# Patient Record
Sex: FEMALE | Race: WHITE | Employment: FULL TIME | URBAN - METROPOLITAN AREA
[De-identification: names, ages, dates, MRNs, and addresses within clinical notes are randomized per-mention and may not be internally consistent; named-entity substitution may affect disease eponyms.]

---
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
## 2021-01-05 LAB
ALBUMIN SERPL-MCNC: 4.7 G/DL (ref 3.9–5)
ALBUMIN/GLOB SERPL: 1.9 {RATIO} (ref 1.2–2.2)
ALP SERPL-CCNC: 73 IU/L (ref 39–117)
ALT SERPL-CCNC: 26 IU/L (ref 0–32)
AST SERPL-CCNC: 20 IU/L (ref 0–40)
BILIRUB SERPL-MCNC: 0.5 MG/DL (ref 0–1.2)
BUN SERPL-MCNC: 7 MG/DL (ref 6–20)
BUN/CREAT SERPL: 10 (ref 9–23)
CALCIUM SERPL-MCNC: 9.5 MG/DL (ref 8.7–10.2)
CHLORIDE SERPL-SCNC: 101 MMOL/L (ref 96–106)
CHOLEST SERPL-MCNC: 175 MG/DL (ref 100–169)
CO2 SERPL-SCNC: 22 MMOL/L (ref 20–29)
CREAT SERPL-MCNC: 0.7 MG/DL (ref 0.57–1)
EST. AVERAGE GLUCOSE BLD GHB EST-MCNC: 88 MG/DL
GLOBULIN SER CALC-MCNC: 2.5 G/DL (ref 1.5–4.5)
GLUCOSE SERPL-MCNC: 86 MG/DL (ref 65–99)
HBA1C MFR BLD: 4.7 % (ref 4.8–5.6)
HDLC SERPL-MCNC: 57 MG/DL
LDLC SERPL CALC-MCNC: 103 MG/DL (ref 0–109)
POTASSIUM SERPL-SCNC: 4.3 MMOL/L (ref 3.5–5.2)
PROT SERPL-MCNC: 7.2 G/DL (ref 6–8.5)
SODIUM SERPL-SCNC: 137 MMOL/L (ref 134–144)
TRIGL SERPL-MCNC: 82 MG/DL (ref 0–89)
TSH SERPL DL<=0.005 MIU/L-ACNC: 1.79 UIU/ML (ref 0.45–4.5)
VLDLC SERPL CALC-MCNC: 15 MG/DL (ref 5–40)

## 2021-06-21 ENCOUNTER — HOSPITAL ENCOUNTER (EMERGENCY)
Age: 21
Discharge: HOME | End: 2021-06-22
Attending: EMERGENCY MEDICINE
Payer: OTHER MISCELLANEOUS

## 2021-06-21 ENCOUNTER — APPOINTMENT (OUTPATIENT)
Dept: GENERAL RADIOLOGY | Age: 21
End: 2021-06-21
Attending: EMERGENCY MEDICINE
Payer: OTHER MISCELLANEOUS

## 2021-06-21 DIAGNOSIS — S82.401A TIBIA/FIBULA FRACTURE, RIGHT, CLOSED, INITIAL ENCOUNTER: Primary | ICD-10-CM

## 2021-06-21 DIAGNOSIS — S82.201A TIBIA/FIBULA FRACTURE, RIGHT, CLOSED, INITIAL ENCOUNTER: Primary | ICD-10-CM

## 2021-06-21 LAB
ALBUMIN SERPL-MCNC: 4.2 G/DL (ref 3.5–5)
ALBUMIN/GLOB SERPL: 1.1 {RATIO} (ref 1.1–2.2)
ALP SERPL-CCNC: 74 U/L (ref 45–117)
ALT SERPL-CCNC: 29 U/L (ref 12–78)
ANION GAP SERPL CALC-SCNC: 5 MMOL/L (ref 5–15)
AST SERPL-CCNC: 21 U/L (ref 15–37)
BASOPHILS # BLD: 0 K/UL (ref 0–0.1)
BASOPHILS NFR BLD: 0 % (ref 0–1)
BILIRUB SERPL-MCNC: 0.4 MG/DL (ref 0.2–1)
BUN SERPL-MCNC: 11 MG/DL (ref 6–20)
BUN/CREAT SERPL: 13 (ref 12–20)
CALCIUM SERPL-MCNC: 9.1 MG/DL (ref 8.5–10.1)
CHLORIDE SERPL-SCNC: 108 MMOL/L (ref 97–108)
CO2 SERPL-SCNC: 26 MMOL/L (ref 21–32)
COMMENT, HOLDF: NORMAL
CREAT SERPL-MCNC: 0.87 MG/DL (ref 0.55–1.02)
DIFFERENTIAL METHOD BLD: NORMAL
EOSINOPHIL # BLD: 0.1 K/UL (ref 0–0.4)
EOSINOPHIL NFR BLD: 1 % (ref 0–7)
ERYTHROCYTE [DISTWIDTH] IN BLOOD BY AUTOMATED COUNT: 12 % (ref 11.5–14.5)
GLOBULIN SER CALC-MCNC: 3.7 G/DL (ref 2–4)
GLUCOSE SERPL-MCNC: 111 MG/DL (ref 65–100)
HCT VFR BLD AUTO: 43.7 % (ref 35–47)
HGB BLD-MCNC: 15.6 G/DL (ref 11.5–16)
IMM GRANULOCYTES # BLD AUTO: 0 K/UL (ref 0–0.04)
IMM GRANULOCYTES NFR BLD AUTO: 0 % (ref 0–0.5)
LYMPHOCYTES # BLD: 3 K/UL (ref 0.8–3.5)
LYMPHOCYTES NFR BLD: 28 % (ref 12–49)
MCH RBC QN AUTO: 31.5 PG (ref 26–34)
MCHC RBC AUTO-ENTMCNC: 35.7 G/DL (ref 30–36.5)
MCV RBC AUTO: 88.3 FL (ref 80–99)
MONOCYTES # BLD: 0.5 K/UL (ref 0–1)
MONOCYTES NFR BLD: 5 % (ref 5–13)
NEUTS SEG # BLD: 6.9 K/UL (ref 1.8–8)
NEUTS SEG NFR BLD: 66 % (ref 32–75)
NRBC # BLD: 0 K/UL (ref 0–0.01)
NRBC BLD-RTO: 0 PER 100 WBC
PLATELET # BLD AUTO: 335 K/UL (ref 150–400)
PMV BLD AUTO: 10.2 FL (ref 8.9–12.9)
POTASSIUM SERPL-SCNC: 3.6 MMOL/L (ref 3.5–5.1)
PROT SERPL-MCNC: 7.9 G/DL (ref 6.4–8.2)
RBC # BLD AUTO: 4.95 M/UL (ref 3.8–5.2)
SAMPLES BEING HELD,HOLD: NORMAL
SODIUM SERPL-SCNC: 139 MMOL/L (ref 136–145)
WBC # BLD AUTO: 10.6 K/UL (ref 3.6–11)

## 2021-06-21 PROCEDURE — 36415 COLL VENOUS BLD VENIPUNCTURE: CPT

## 2021-06-21 PROCEDURE — 74011250636 HC RX REV CODE- 250/636: Performed by: EMERGENCY MEDICINE

## 2021-06-21 PROCEDURE — 99285 EMERGENCY DEPT VISIT HI MDM: CPT

## 2021-06-21 PROCEDURE — 86901 BLOOD TYPING SEROLOGIC RH(D): CPT

## 2021-06-21 PROCEDURE — 96374 THER/PROPH/DIAG INJ IV PUSH: CPT

## 2021-06-21 PROCEDURE — 85610 PROTHROMBIN TIME: CPT

## 2021-06-21 PROCEDURE — 96376 TX/PRO/DX INJ SAME DRUG ADON: CPT

## 2021-06-21 PROCEDURE — 99151 MOD SED SAME PHYS/QHP <5 YRS: CPT

## 2021-06-21 PROCEDURE — 85025 COMPLETE CBC W/AUTO DIFF WBC: CPT

## 2021-06-21 PROCEDURE — 75810000303 HC CLSD TRMT  FRACTURE/DISLOCATION W/  ANES

## 2021-06-21 PROCEDURE — 99284 EMERGENCY DEPT VISIT MOD MDM: CPT

## 2021-06-21 PROCEDURE — 80053 COMPREHEN METABOLIC PANEL: CPT

## 2021-06-21 PROCEDURE — 84703 CHORIONIC GONADOTROPIN ASSAY: CPT

## 2021-06-21 PROCEDURE — 73590 X-RAY EXAM OF LOWER LEG: CPT

## 2021-06-21 PROCEDURE — 73610 X-RAY EXAM OF ANKLE: CPT

## 2021-06-21 RX ORDER — FENTANYL CITRATE 50 UG/ML
100 INJECTION, SOLUTION INTRAMUSCULAR; INTRAVENOUS
Status: COMPLETED | OUTPATIENT
Start: 2021-06-21 | End: 2021-06-22

## 2021-06-21 RX ORDER — LORAZEPAM 2 MG/ML
1 INJECTION INTRAMUSCULAR AS NEEDED
Status: DISCONTINUED | OUTPATIENT
Start: 2021-06-21 | End: 2021-06-22 | Stop reason: HOSPADM

## 2021-06-21 RX ORDER — KETAMINE HYDROCHLORIDE 50 MG/ML
2 INJECTION, SOLUTION INTRAMUSCULAR; INTRAVENOUS
Status: DISCONTINUED | OUTPATIENT
Start: 2021-06-21 | End: 2021-06-22

## 2021-06-21 RX ORDER — FENTANYL CITRATE 50 UG/ML
100 INJECTION, SOLUTION INTRAMUSCULAR; INTRAVENOUS
Status: COMPLETED | OUTPATIENT
Start: 2021-06-21 | End: 2021-06-21

## 2021-06-21 RX ADMIN — FENTANYL CITRATE 100 MCG: 50 INJECTION INTRAMUSCULAR; INTRAVENOUS at 22:03

## 2021-06-21 RX ADMIN — SODIUM CHLORIDE 1000 ML: 900 INJECTION, SOLUTION INTRAVENOUS at 23:06

## 2021-06-21 RX ADMIN — FENTANYL CITRATE 100 MCG: 50 INJECTION INTRAMUSCULAR; INTRAVENOUS at 23:26

## 2021-06-21 NOTE — Clinical Note
[address]  [facility] EMERGENCY DEPT  [address] [facility] [facility]  [place] [address]  [phone]    Work/School Note    Date: 6/21/2021    To Whom It May concern:    Kevin Salgado was seen and treated today in the emergency room by the following provider(s):  Attending Provider: Liseth Burton MD.      Kevin Salgado is excused from work/school on 06/22/21 and 06/23/21. She is medically clear to return to work/school on 6/24/2021.        Sincerely,          Carmela Roth MD

## 2021-06-22 ENCOUNTER — APPOINTMENT (OUTPATIENT)
Dept: GENERAL RADIOLOGY | Age: 21
End: 2021-06-22
Attending: EMERGENCY MEDICINE
Payer: OTHER MISCELLANEOUS

## 2021-06-22 VITALS
SYSTOLIC BLOOD PRESSURE: 135 MMHG | DIASTOLIC BLOOD PRESSURE: 84 MMHG | WEIGHT: 230 LBS | BODY MASS INDEX: 39.27 KG/M2 | HEART RATE: 103 BPM | TEMPERATURE: 98.3 F | HEIGHT: 64 IN | RESPIRATION RATE: 16 BRPM | OXYGEN SATURATION: 99 %

## 2021-06-22 LAB
ABO + RH BLD: NORMAL
BLOOD GROUP ANTIBODIES SERPL: NORMAL
HCG SERPL QL: NEGATIVE
INR PPP: 1.1 (ref 0.9–1.1)
PROTHROMBIN TIME: 11.3 SEC (ref 9–11.1)
SPECIMEN EXP DATE BLD: NORMAL

## 2021-06-22 PROCEDURE — 73600 X-RAY EXAM OF ANKLE: CPT

## 2021-06-22 PROCEDURE — 96376 TX/PRO/DX INJ SAME DRUG ADON: CPT

## 2021-06-22 PROCEDURE — 74011000250 HC RX REV CODE- 250: Performed by: PHYSICIAN ASSISTANT

## 2021-06-22 PROCEDURE — 74011250636 HC RX REV CODE- 250/636: Performed by: EMERGENCY MEDICINE

## 2021-06-22 PROCEDURE — 96375 TX/PRO/DX INJ NEW DRUG ADDON: CPT

## 2021-06-22 RX ORDER — OXYCODONE AND ACETAMINOPHEN 5; 325 MG/1; MG/1
1 TABLET ORAL
Qty: 30 TABLET | Refills: 0 | Status: SHIPPED | OUTPATIENT
Start: 2021-06-22 | End: 2021-06-25

## 2021-06-22 RX ORDER — OXYCODONE AND ACETAMINOPHEN 5; 325 MG/1; MG/1
1 TABLET ORAL
Qty: 10 TABLET | Refills: 0 | Status: SHIPPED | OUTPATIENT
Start: 2021-06-22 | End: 2021-06-22 | Stop reason: SDUPTHER

## 2021-06-22 RX ORDER — OXYCODONE AND ACETAMINOPHEN 5; 325 MG/1; MG/1
1 TABLET ORAL
Qty: 10 TABLET | Refills: 0 | Status: SHIPPED | OUTPATIENT
Start: 2021-06-22 | End: 2021-06-25

## 2021-06-22 RX ORDER — KETAMINE HYDROCHLORIDE 10 MG/ML
0.35 INJECTION, SOLUTION INTRAMUSCULAR; INTRAVENOUS ONCE
Status: COMPLETED | OUTPATIENT
Start: 2021-06-22 | End: 2021-06-22

## 2021-06-22 RX ORDER — MORPHINE SULFATE 2 MG/ML
4 INJECTION, SOLUTION INTRAMUSCULAR; INTRAVENOUS
Status: COMPLETED | OUTPATIENT
Start: 2021-06-22 | End: 2021-06-22

## 2021-06-22 RX ADMIN — FENTANYL CITRATE 100 MCG: 50 INJECTION INTRAMUSCULAR; INTRAVENOUS at 00:47

## 2021-06-22 RX ADMIN — MORPHINE SULFATE 4 MG: 2 INJECTION, SOLUTION INTRAMUSCULAR; INTRAVENOUS at 02:23

## 2021-06-22 RX ADMIN — KETAMINE HYDROCHLORIDE 36.5 MG: 10 INJECTION, SOLUTION INTRAMUSCULAR; INTRAVENOUS at 00:35

## 2021-06-22 NOTE — ED PROVIDER NOTES
EMERGENCY DEPARTMENT HISTORY AND PHYSICAL EXAM     ------------------------------------------------------------------------------------------------------  Please note that this dictation was completed with [employer], the [employer] voice recognition software. Quite often unanticipated grammatical, syntax, homophones, and other interpretive errors are inadvertently transcribed by the computer software. Please disregard these errors. Please excuse any errors that have escaped final proofreading.  -----------------------------------------------------------------------------------------------------------------    Date: 6/21/2021  Patient Name: [facility]    History of Presenting Illness     Chief Complaint   Patient presents with    Leg Pain     pt fell off stool at work, hurt R lower leg, pt not able to ambulate or lower leg, deformity to lower part of leg with bruising       History Provided By: Patient, family    HPI: [facility] is a 24 y.o. female, without significant pmhx, who presents via private vehicle to the ED with c/o right ankle pain and swelling. Patient reports she works at [employer] and was standing on a stool when she subsequently fell onto her right leg. Patient with immediate pain, bruising and swelling and deformity of her ankle. Denies having hit her head or loss of consciousness. Denies having previous surgery or anesthesia. No daily medications. Pt also specifically denies any recent fevers, chills, CP, SOB, nausea, vomiting, diarrhea, abd pain, changes in BM, urinary sxs, or headache. PCP: Jayne Vale MD    Social Hx: denies tobacco, denies EtOH, denies Illicit Drugs     There are no other complaints, changes, or physical findings at this time.      No Known Allergies      Current Facility-Administered Medications   Medication Dose Route Frequency Provider Last Rate Last Admin    LORazepam (ATIVAN) injection 1 mg  1 mg IntraVENous PRN Malina Guerin MD  sodium chloride 0.9 % bolus infusion 1,000 mL  1,000 mL IntraVENous NOW Nathan Buckner MD         Current Outpatient Medications   Medication Sig Dispense Refill    dicyclomine (BENTYL) 10 mg capsule       norethindrone-ethinyl estradiol (Microgestin FE 1/20, 28,) 1 mg-20 mcg (21)/75 mg (7) tab Microgestin FE 1/20 (28) 1 mg-20 mcg (21)/75 mg (7) tablet   Take 1 tablet(s) every day by oral route.  omeprazole (PRILOSEC) 20 mg capsule       fexofenadine HCl (ALLEGRA PO) Take  by mouth daily. Past History     Past Medical History:  No past medical history on file. Past Surgical History:  Past Surgical History:   Procedure Laterality Date    HX OTHER SURGICAL  4/2/2013    dental surgery(mouth)       Family History:  Family History   Problem Relation Age of Onset    Hypertension Mother     Thyroid Disease Mother     Diabetes Father     Psychiatric Disorder Father        Social History:  Social History     Tobacco Use    Smoking status: Never Smoker    Smokeless tobacco: Never Used   Substance Use Topics    Alcohol use: No    Drug use: Not Currently       Allergies:  No Known Allergies      Review of Systems   Review of Systems   Constitutional: Negative. Negative for fever. Eyes: Negative. Respiratory: Negative. Negative for shortness of breath. Cardiovascular: Negative for chest pain. Gastrointestinal: Negative for abdominal pain, nausea and vomiting. Endocrine: Negative. Genitourinary: Negative. Negative for difficulty urinating, dysuria and hematuria. Musculoskeletal: Positive for arthralgias and myalgias. Skin: Negative. Neurological: Negative. Psychiatric/Behavioral: Negative for suicidal ideas. All other systems reviewed and are negative. Physical Exam   Physical Exam  Vitals and nursing note reviewed. Constitutional:       General: She is not in acute distress. Appearance: She is well-developed. She is not diaphoretic.    HENT:      Head: Normocephalic and atraumatic. Nose: Nose normal.   Eyes:      General: No scleral icterus. Conjunctiva/sclera: Conjunctivae normal.   Neck:      Trachea: No tracheal deviation. Cardiovascular:      Rate and Rhythm: Normal rate and regular rhythm. Heart sounds: Normal heart sounds. No murmur heard. No friction rub. Pulmonary:      Effort: Pulmonary effort is normal. No respiratory distress. Breath sounds: Normal breath sounds. No stridor. No wheezing or rales. Abdominal:      General: Bowel sounds are normal. There is no distension. Palpations: Abdomen is soft. Tenderness: There is no abdominal tenderness. There is no rebound. Musculoskeletal:         General: Normal range of motion. Cervical back: Normal range of motion. Right lower leg: Swelling, deformity, tenderness and bony tenderness present. Legs:       Comments: Compartments are soft   Skin:     General: Skin is warm and dry. Findings: No rash. Neurological:      Mental Status: She is alert and oriented to person, place, and time. Cranial Nerves: No cranial nerve deficit. Psychiatric:         Speech: Speech normal.         Behavior: Behavior normal.         Thought Content: Thought content normal.         Judgment: Judgment normal.           Diagnostic Study Results     Labs -     Recent Results (from the past 12 hour(s))   SAMPLES BEING HELD    Collection Time: 06/21/21  9:59 PM   Result Value Ref Range    SAMPLES BEING HELD LV PST     COMMENT        Add-on orders for these samples will be processed based on acceptable specimen integrity and analyte stability, which may vary by analyte.    CBC WITH AUTOMATED DIFF    Collection Time: 06/21/21  9:59 PM   Result Value Ref Range    WBC 10.6 3.6 - 11.0 K/uL    RBC 4.95 3.80 - 5.20 M/uL    HGB 15.6 11.5 - 16.0 g/dL    HCT 43.7 35.0 - 47.0 %    MCV 88.3 80.0 - 99.0 FL    MCH 31.5 26.0 - 34.0 PG    MCHC 35.7 30.0 - 36.5 g/dL    RDW 12.0 11.5 - 14.5 %    PLATELET 524 663 - 621 K/uL    MPV 10.2 8.9 - 12.9 FL    NRBC 0.0 0  WBC    ABSOLUTE NRBC 0.00 0.00 - 0.01 K/uL    NEUTROPHILS 66 32 - 75 %    LYMPHOCYTES 28 12 - 49 %    MONOCYTES 5 5 - 13 %    EOSINOPHILS 1 0 - 7 %    BASOPHILS 0 0 - 1 %    IMMATURE GRANULOCYTES 0 0.0 - 0.5 %    ABS. NEUTROPHILS 6.9 1.8 - 8.0 K/UL    ABS. LYMPHOCYTES 3.0 0.8 - 3.5 K/UL    ABS. MONOCYTES 0.5 0.0 - 1.0 K/UL    ABS. EOSINOPHILS 0.1 0.0 - 0.4 K/UL    ABS. BASOPHILS 0.0 0.0 - 0.1 K/UL    ABS. IMM. GRANS. 0.0 0.00 - 0.04 K/UL    DF AUTOMATED     METABOLIC PANEL, COMPREHENSIVE    Collection Time: 06/21/21  9:59 PM   Result Value Ref Range    Sodium 139 136 - 145 mmol/L    Potassium 3.6 3.5 - 5.1 mmol/L    Chloride 108 97 - 108 mmol/L    CO2 26 21 - 32 mmol/L    Anion gap 5 5 - 15 mmol/L    Glucose 111 (H) 65 - 100 mg/dL    BUN 11 6 - 20 MG/DL    Creatinine 0.87 0.55 - 1.02 MG/DL    BUN/Creatinine ratio 13 12 - 20      GFR est AA >60 >60 ml/min/1.73m2    GFR est non-AA >60 >60 ml/min/1.73m2    Calcium 9.1 8.5 - 10.1 MG/DL    Bilirubin, total 0.4 0.2 - 1.0 MG/DL    ALT (SGPT) 29 12 - 78 U/L    AST (SGOT) 21 15 - 37 U/L    Alk. phosphatase 74 45 - 117 U/L    Protein, total 7.9 6.4 - 8.2 g/dL    Albumin 4.2 3.5 - 5.0 g/dL    Globulin 3.7 2.0 - 4.0 g/dL    A-G Ratio 1.1 1.1 - 2.2     PROTHROMBIN TIME + INR    Collection Time: 06/21/21 11:46 PM   Result Value Ref Range    INR 1.1 0.9 - 1.1      Prothrombin time 11.3 (H) 9.0 - 11.1 sec   TYPE & SCREEN    Collection Time: 06/21/21 11:46 PM   Result Value Ref Range    Crossmatch Expiration 06/24/2021,2359     ABO/Rh(D) Ayde Winters NEGATIVE     Antibody screen NEG    HCG QL SERUM    Collection Time: 06/21/21 11:46 PM   Result Value Ref Range    HCG, Ql. Negative NEG         Radiologic Studies -   XR ANKLE RT AP/LAT   Final Result   Slightly improved alignment of displaced distal tibia and fibular   fractures following closed reduction.       XR ANKLE RT MIN 3 V   Final Result   Displaced oblique fractures of the distal tibia and fibula. XR TIB/FIB RT   Final Result   Displaced oblique fractures of the distal tibia and fibula. CT Results  (Last 48 hours)    None        CXR Results  (Last 48 hours)    None            Medical Decision Making   I am the first provider for this patient. I reviewed the vital signs, available nursing notes, past medical history, past surgical history, family history and social history. Vital Signs-Reviewed the patient's vital signs. Patient Vitals for the past 12 hrs:   Temp Pulse Resp BP SpO2   06/22/21 0049  (!) 111 22 (!) 168/97 97 %   06/22/21 0042  (!) 119 22 (!) 170/110 98 %   06/21/21 2300 98.5 °F (36.9 °C) (!) 105 18 (!) 147/89 98 %   06/21/21 2245    (!) 143/86 98 %   06/21/21 2230    (!) 155/93 99 %   06/21/21 2138 98.5 °F (36.9 °C)  18 (!) 156/104 99 %       Pulse Oximetry Analysis - 99% on RA Normal    Records Reviewed/Interpretted: Nursing Notes from triage and Old Medical Records, previous emergency department visits for acute chest pain and sensation of swelling in throat    Provider Notes (Medical Decision Making):     DDX:  Tib-fib fracture, ligamentous injury    Plan:  X-ray, analgesic, Ortho consult, splinting    Impression:  Distal tib-fib fracture    ED Course:   Initial assessment performed. The patients presenting problems have been discussed, and they are in agreement with the care plan formulated and outlined with them. I have encouraged them to ask questions as they arise throughout their visit. I reviewed our electronic medical record system for any past medical records that were available that may contribute to the patients current condition, the nursing notes and and vital signs from today's visit  Nursing notes will be reviewed as they become available in realtime while the pt has been in the ED. Henrine Krabbe, MD      I personally reviewed/interpreted pt's imaging.   Agree with official read by radiology as noted above. Jona Piper MD    CONSULT NOTE:   11:08 PM  Jona Piper MD spoke with  SCOTT Gurrola,   Specialty: Orthopedics  Consulted Orthopedic team by Lorena Jennings due to distal tib fib fracture. Discussed pt's HPI and available diagnostic results thus far. Consultant will come evaluate patient and assist with splinting. Jona Piper MD    Procedure Note - Reduction:    11:10 PM  Performed by Frank CHAVEZ. Procedure start time: 2336  Procedure end time: 0045  Procedure was performed with sedation. Medications provided as below:  Medications   LORazepam (ATIVAN) injection 1 mg (has no administration in time range)   sodium chloride 0.9 % bolus infusion 1,000 mL (has no administration in time range)   fentaNYL citrate (PF) injection 100 mcg (100 mcg IntraVENous Given 6/21/21 2203)   fentaNYL citrate (PF) injection 100 mcg (100 mcg IntraVENous Given 6/22/21 0047)   ketamine (KETALAR) 10 mg/mL injection 36.5 mg (36.5 mg IntraVENous Given 6/22/21 0035)       Immediately prior to the procedure, the patient was reevaluated and found suitable for the planned procedure and any planned medications. Immediately prior to the procedure a time out was called to verify the correct patient, procedure, equipment, staff, and marking as appropriate. Prior to the procedure, neurovascular exam was intact. Analgesia was obtained with procedural sedation - see record. To achieve reduction of the patient's right ankle joint, logitudinal traction manipulation was utilized. The joint was successfully reduced. Neurovascular exam was intact following the procedure. Post reduction x-ray ordered. The procedure took 1-15 minutes, and pt tolerated well.       1:12 AM  Progress note:  Pt noted to be feeling better, ready for discharge. Discussed lab and imaging findings with pt, specifically noting tib/fib fx. Pt will follow up with ortho as instructed.  All questions have been answered, pt voiced understanding and agreement with plan. Specific return precautions provided in addition to instructions for pt to return to the ED immediately should sx worsen at any time. Heather Holloway MD             Critical Care Time:     none      Diagnosis     Clinical Impression:   1. Tibia/fibula fracture, right, closed, initial encounter        PLAN:  1. Current Discharge Medication List        2. Follow-up Information     Follow up With Specialties Details Why Contact Info    Kamille Cagle MD Orthopedic Surgery Call today  [address]  [address]  [address] [address]  [phone]      Aiden JonesrDO Orthopedic Surgery Call today  [address] [facility]  [address] Refugio Bond MD Internal Medicine Schedule an appointment as soon as possible for a visit in 2 days  [address] [place] [address]  [phone]          Return to ED if worse     Disposition:    1:12 AM   The patient's results have been reviewed with family and/or caregiver. They verbally convey their understanding and agreement of the patient's signs, symptoms, diagnosis, treatment and prognosis and additionally agree to follow up as recommended in the discharge instructions or to return to the Emergency Room should the patient's condition change prior to their follow-up appointment. The family and/or caregiver verbally agrees with the care-plan and all of their questions have been answered. The discharge instructions have also been provided to the them with educational information regarding the patient's diagnosis as well a list of reasons why the patient would want to return to the ER prior to their follow-up appointment should their condition change.   Heather Holloway MD

## 2021-06-22 NOTE — PROGRESS NOTES
ORTHOPAEDIC CONSULT NOTE    Subjective:     Date of Consultation:  June 22, 2021      Ryan Wnag is a 24 y.o. female who is being seen for right distal tib/fib fracture. Pt reports falling from a stool at work([employer]). Ambulates at baseline unassisted. 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 ?  Parents at bedside    Patient Active Problem List    Diagnosis Date Noted    Migraine without aura and without status migrainosus, not intractable 12/11/2020    Gastroesophageal reflux disease without esophagitis 12/11/2020    Anxiety and depression 12/11/2020    Other irritable bowel syndrome 12/11/2020     Family History   Problem Relation Age of Onset    Hypertension Mother     Thyroid Disease Mother     Diabetes Father     Psychiatric Disorder Father       Social History     Tobacco Use    Smoking status: Never Smoker    Smokeless tobacco: Never Used   Substance Use Topics    Alcohol use: No     No past medical history on file. Past Surgical History:   Procedure Laterality Date    HX OTHER SURGICAL  4/2/2013    dental surgery(mouth)      Prior to Admission medications    Medication Sig Start Date End Date Taking? Authorizing Provider   dicyclomine (BENTYL) 10 mg capsule  12/8/20   Provider, Historical   norethindrone-ethinyl estradiol (Microgestin FE 1/20, 28,) 1 mg-20 mcg (21)/75 mg (7) tab Microgestin FE 1/20 (28) 1 mg-20 mcg (21)/75 mg (7) tablet   Take 1 tablet(s) every day by oral route. Provider, Historical   omeprazole (PRILOSEC) 20 mg capsule  10/13/20   Provider, Historical   fexofenadine HCl (ALLEGRA PO) Take  by mouth daily.     Other, MD Kristian     Current Facility-Administered Medications   Medication Dose Route Frequency    LORazepam (ATIVAN) injection 1 mg  1 mg IntraVENous PRN    sodium chloride 0.9 % bolus infusion 1,000 mL  1,000 mL IntraVENous NOW     Current Outpatient Medications   Medication Sig    dicyclomine (BENTYL) 10 mg capsule     norethindrone-ethinyl estradiol (Microgestin FE /, 28,) 1 mg-20 mcg (21)/75 mg (7) tab Microgestin FE / (28) 1 mg-20 mcg (21)/75 mg (7) tablet   Take 1 tablet(s) every day by oral route.  omeprazole (PRILOSEC) 20 mg capsule     fexofenadine HCl (ALLEGRA PO) Take  by mouth daily. No Known Allergies     Review of Systems:  A comprehensive review of systems was negative except for that written in the HPI. Mental Status: no dementia    Objective:     Patient Vitals for the past 8 hrs:   BP Temp Pulse Resp SpO2 Height Weight   21 0049 (!) 168/97  (!) 111 22 97 %     21 0042 (!) 170/110  (!) 119 22 98 %     21 2300 (!) 147/89 98.5 °F (36.9 °C) (!) 105 18 98 %     21 2245 (!) 143/86    98 %     21 2230 (!) 155/93    99 %     21 2138 (!) 156/104 98.5 °F (36.9 °C)  18 99 % 5' 4\" (1.626 m) 104.3 kg (230 lb)     Temp (24hrs), Av.5 °F (36.9 °C), Min:98.5 °F (36.9 °C), Max:98.5 °F (36.9 °C)      Gen: Well-developed,  in no acute distress, obese  HEENT: Pink conjunctivae, hearing intact to voice, moist mucous membranes   Neck: Supple  Resp: No respiratory distress   Card: RRR, palpable distal pulse-equal bilaterally, birsk cap refill all distal digits   Abd:  non-distended  Musc: right distal leg with subtle deformity. Moderate swelling and early ecchymosis about the medal aspect(see pic). compartments are soft, intact EHL/FHL, intact achilles mech. Skin: intact, No skin breakdown noted. Skin warm, pink, dry  Neuro: Cranial nerves are grossly intact, no focal motor weakness, follows commands appropriately   Psych: Good insight, oriented to person, place and time, alert            Body mass index is 39.48 kg/m². Imaging Review: INDICATION: Right ankle pain and deformity   FINDINGS: 3 views of the right ankle and AP and lateral views of the right tibia  and fibula demonstrate displaced, oblique fractures of the distal tibial and  fibular shafts.  There is apex anterior angulation of the fracture fragments. No  associated dislocation is evident. Mild soft tissue swelling is seen over the fracture site.   IMPRESSION--Displaced oblique fractures of the distal tibia and fibula. Labs:   Recent Results (from the past 24 hour(s))   SAMPLES BEING HELD    Collection Time: 06/21/21  9:59 PM   Result Value Ref Range    SAMPLES BEING HELD LV PST     COMMENT        Add-on orders for these samples will be processed based on acceptable specimen integrity and analyte stability, which may vary by analyte. CBC WITH AUTOMATED DIFF    Collection Time: 06/21/21  9:59 PM   Result Value Ref Range    WBC 10.6 3.6 - 11.0 K/uL    RBC 4.95 3.80 - 5.20 M/uL    HGB 15.6 11.5 - 16.0 g/dL    HCT 43.7 35.0 - 47.0 %    MCV 88.3 80.0 - 99.0 FL    MCH 31.5 26.0 - 34.0 PG    MCHC 35.7 30.0 - 36.5 g/dL    RDW 12.0 11.5 - 14.5 %    PLATELET 523 595 - 057 K/uL    MPV 10.2 8.9 - 12.9 FL    NRBC 0.0 0  WBC    ABSOLUTE NRBC 0.00 0.00 - 0.01 K/uL    NEUTROPHILS 66 32 - 75 %    LYMPHOCYTES 28 12 - 49 %    MONOCYTES 5 5 - 13 %    EOSINOPHILS 1 0 - 7 %    BASOPHILS 0 0 - 1 %    IMMATURE GRANULOCYTES 0 0.0 - 0.5 %    ABS. NEUTROPHILS 6.9 1.8 - 8.0 K/UL    ABS. LYMPHOCYTES 3.0 0.8 - 3.5 K/UL    ABS. MONOCYTES 0.5 0.0 - 1.0 K/UL    ABS. EOSINOPHILS 0.1 0.0 - 0.4 K/UL    ABS. BASOPHILS 0.0 0.0 - 0.1 K/UL    ABS. IMM.  GRANS. 0.0 0.00 - 0.04 K/UL    DF AUTOMATED     METABOLIC PANEL, COMPREHENSIVE    Collection Time: 06/21/21  9:59 PM   Result Value Ref Range    Sodium 139 136 - 145 mmol/L    Potassium 3.6 3.5 - 5.1 mmol/L    Chloride 108 97 - 108 mmol/L    CO2 26 21 - 32 mmol/L    Anion gap 5 5 - 15 mmol/L    Glucose 111 (H) 65 - 100 mg/dL    BUN 11 6 - 20 MG/DL    Creatinine 0.87 0.55 - 1.02 MG/DL    BUN/Creatinine ratio 13 12 - 20      GFR est AA >60 >60 ml/min/1.73m2    GFR est non-AA >60 >60 ml/min/1.73m2    Calcium 9.1 8.5 - 10.1 MG/DL    Bilirubin, total 0.4 0.2 - 1.0 MG/DL    ALT (SGPT) 29 12 - 78 U/L    AST (SGOT) 21 15 - 37 U/L    Alk. phosphatase 74 45 - 117 U/L    Protein, total 7.9 6.4 - 8.2 g/dL    Albumin 4.2 3.5 - 5.0 g/dL    Globulin 3.7 2.0 - 4.0 g/dL    A-G Ratio 1.1 1.1 - 2.2     PROTHROMBIN TIME + INR    Collection Time: 06/21/21 11:46 PM   Result Value Ref Range    INR 1.1 0.9 - 1.1      Prothrombin time 11.3 (H) 9.0 - 11.1 sec   TYPE & SCREEN    Collection Time: 06/21/21 11:46 PM   Result Value Ref Range    Crossmatch Expiration 06/24/2021,2350     ABO/Rh(D) Sonia Trejo NEGATIVE     Antibody screen NEG    HCG QL SERUM    Collection Time: 06/21/21 11:46 PM   Result Value Ref Range    HCG, Ql. Negative NEG           Impression:     Patient Active Problem List    Diagnosis Date Noted    Migraine without aura and without status migrainosus, not intractable 12/11/2020    Gastroesophageal reflux disease without esophagitis 12/11/2020    Anxiety and depression 12/11/2020    Other irritable bowel syndrome 12/11/2020     Active Problems:    * No active hospital problems. *    Right distal tib/fib fracture  Plan:       Procedure:   DIscussed nature of condition and treatment options with patient and parents  Consent signed for closed reduction  Right leg/distal tib/fib fracture: pain mgt dose ketamine  Proceeded with reduction using gentle manual manipulation. I fitted a well padded, well molded posterior and stirrup splint for immobilization. Patient tolerated the procedure well. Neurovascular exam intact post procedure, Splint precautions reviewed. Post procedure films confirm improved alignment     Aggressive elevation and ice  Keep splint clean and dry  Crutches for safe mobility  NWB on the RLE  Percocet for pain mgt  Call for appt w/ Dr Nolan/Wesly Contreras aware and agrees with plan as above.         Isael Segura PA-C  [employer]

## 2021-06-22 NOTE — ED NOTES
Closed reduction of right ankle performed. Pt tolerated procedure well    I have reviewed discharge instructions with the patient/mother. The patient/mother verbalized understanding.

## 2021-06-22 NOTE — DISCHARGE INSTRUCTIONS
It was a pleasure taking care of you in our Emergency Department today. We know that when you come to [facility] you are entrusting us with your health, comfort, and safety. Our physicians and nurses honor that trust, and truly appreciate the opportunity to care for you and your loved ones. We also value your feedback. If you receive a survey about your Emergency Department experience today, please fill it out. We care about our patients' feedback, and we listen to what you have to say. Thank you!       Dr. Mile Salvador MD.

## 2021-06-23 ENCOUNTER — TELEPHONE (OUTPATIENT)
Dept: ORTHOPEDIC SURGERY | Age: 21
End: 2021-06-23

## 2021-07-03 ENCOUNTER — APPOINTMENT (OUTPATIENT)
Dept: GENERAL RADIOLOGY | Age: 21
End: 2021-07-03
Attending: EMERGENCY MEDICINE
Payer: OTHER MISCELLANEOUS

## 2021-07-03 ENCOUNTER — HOSPITAL ENCOUNTER (EMERGENCY)
Age: 21
Discharge: HOME | End: 2021-07-04
Attending: STUDENT IN AN ORGANIZED HEALTH CARE EDUCATION/TRAINING PROGRAM
Payer: OTHER MISCELLANEOUS

## 2021-07-03 VITALS
DIASTOLIC BLOOD PRESSURE: 74 MMHG | HEART RATE: 97 BPM | RESPIRATION RATE: 18 BRPM | SYSTOLIC BLOOD PRESSURE: 123 MMHG | OXYGEN SATURATION: 99 % | TEMPERATURE: 98.3 F | WEIGHT: 230 LBS | HEIGHT: 64 IN | BODY MASS INDEX: 39.27 KG/M2

## 2021-07-03 DIAGNOSIS — S82.401S TIBIA/FIBULA FRACTURE, RIGHT, SEQUELA: ICD-10-CM

## 2021-07-03 DIAGNOSIS — S82.201S TIBIA/FIBULA FRACTURE, RIGHT, SEQUELA: ICD-10-CM

## 2021-07-03 DIAGNOSIS — M79.604 RIGHT LEG PAIN: Primary | ICD-10-CM

## 2021-07-03 PROCEDURE — 73590 X-RAY EXAM OF LOWER LEG: CPT

## 2021-07-03 PROCEDURE — 99283 EMERGENCY DEPT VISIT LOW MDM: CPT

## 2021-07-04 NOTE — ED NOTES
2249 - Portable XR at bedside for imaging;; family member arrived, at bedside for comfort and care;;

## 2021-07-04 NOTE — ED NOTES
Pt arrives to the ED via EMS. EMS was sent to triage then to ED bed. Pt states she had broken her right leg (tib & fib fracture) 2 weeks ago and has a cast placed. Pt states she was trying to sit from a standing to a sitting position and felt a \"shift\" in her leg. She states to have a lot of pain in the right leg.

## 2021-07-04 NOTE — ED PROVIDER NOTES
EMERGENCY DEPARTMENT HISTORY AND PHYSICAL EXAM      Date: 7/3/2021  Patient Name: [facility]    History of Presenting Illness     Chief Complaint   Patient presents with    Foot Pain     ED visit d/t (R) leg pain - onset of sxs, this afternoon - reports having tib fib fx June 21st - this evening pt heard and felt a pop leading to increased pain and tingling - Denies lack of sensation; History Provided By: Patient    HPI: [facility], 24 y.o. female with past medical history of anxiety presents to the ED with cc of right leg pain. Patient recently sustained a right tib-fib fracture, for which she was seen in the ED for on June 21, and required fracture reduction at that time. States that she has since followed up with Dr. Gelacio Piña with [facility]. States that she was put into a cast a few days ago during her in office Ortho follow-up visit. Patient has been apprehensive about her current cast as she feels it is not giving her \"enough support\" and she feels like she can move her legs inside the cast itself. This evening, patient reports hearing a \"pop\" in her lower leg/ankle region, which was then followed by tingling sharp pain. She denies actual direct impact to the right lower leg or ankle itself. States that she did not hit it against anything, and that the right leg did not fall onto the ground, she denies excellently weightbearing onto the right lower extremity. She denies possibility of accidentally rotating or moving her right lower leg in any extreme direction prior to onset of symptoms. She states that the pain is actually not bothering her much right now. Denies any weakness or numbness to distal aspects of her right toes. However, patient admits that she suffers from anxiety and cannot stop thinking about the possibility that her symptoms may be related to shifting of the bones or recurrent dislocation of the fracture.     PCP: Kati Dejesus MD    No current facility-administered medications on file prior to encounter. Current Outpatient Medications on File Prior to Encounter   Medication Sig Dispense Refill    dicyclomine (BENTYL) 10 mg capsule       norethindrone-ethinyl estradiol (Microgestin FE 1/20, 28,) 1 mg-20 mcg (21)/75 mg (7) tab Microgestin FE 1/20 (28) 1 mg-20 mcg (21)/75 mg (7) tablet   Take 1 tablet(s) every day by oral route.  omeprazole (PRILOSEC) 20 mg capsule       fexofenadine HCl (ALLEGRA PO) Take  by mouth daily. Past History     Past Medical History:  No past medical history on file. Past Surgical History:  Past Surgical History:   Procedure Laterality Date    HX OTHER SURGICAL  4/2/2013    dental surgery(mouth)       Family History:  Family History   Problem Relation Age of Onset    Hypertension Mother     Thyroid Disease Mother     Diabetes Father     Psychiatric Disorder Father        Social History:  Social History     Tobacco Use    Smoking status: Never Smoker    Smokeless tobacco: Never Used   Substance Use Topics    Alcohol use: No    Drug use: Not Currently       Allergies:  No Known Allergies      Review of Systems   Review of Systems   Constitutional: Negative for chills and fever. HENT: Negative for congestion and rhinorrhea. Eyes: Negative for visual disturbance. Respiratory: Negative for chest tightness and shortness of breath. Cardiovascular: Negative for chest pain, palpitations and leg swelling. Gastrointestinal: Negative for abdominal pain, diarrhea, nausea and vomiting. Genitourinary: Negative for dysuria, flank pain and hematuria. Musculoskeletal: Positive for arthralgias. Negative for back pain and neck pain. Skin: Negative for color change and rash. Allergic/Immunologic: Negative for immunocompromised state. Neurological: Negative for dizziness, speech difficulty, weakness and headaches. Hematological: Negative for adenopathy.    Psychiatric/Behavioral: Negative for dysphoric mood and suicidal ideas. Physical Exam   Physical Exam  Vitals and nursing note reviewed. Constitutional:       General: She is not in acute distress. Appearance: She is not ill-appearing or toxic-appearing. HENT:      Head: Normocephalic and atraumatic. Nose: Nose normal.      Mouth/Throat:      Mouth: Mucous membranes are moist.   Eyes:      Extraocular Movements: Extraocular movements intact. Pupils: Pupils are equal, round, and reactive to light. Cardiovascular:      Rate and Rhythm: Normal rate and regular rhythm. Pulses: Normal pulses. Pulmonary:      Effort: Pulmonary effort is normal.      Breath sounds: No stridor. No wheezing or rhonchi. Abdominal:      General: Abdomen is flat. There is no distension. Tenderness: There is no abdominal tenderness. Musculoskeletal:         General: Normal range of motion. Cervical back: Normal range of motion and neck supple. Legs:    Skin:     General: Skin is warm and dry. Neurological:      General: No focal deficit present. Mental Status: She is alert and oriented to person, place, and time. Psychiatric:         Judgment: Judgment normal.         Diagnostic Study Results     Labs -   No results found for this or any previous visit (from the past 24 hour(s)). Radiologic Studies -   XR TIB/FIB RT   Final Result      Unchanged mildly displaced extra-articular fractures of the distal tibia and   fibula. No new fracture. CT Results  (Last 48 hours)    None        CXR Results  (Last 48 hours)    None          Medical Decision Making   I am the first provider for this patient. I reviewed the vital signs, available nursing notes, past medical history, past surgical history, family history and social history. Vital Signs-Reviewed the patient's vital signs.   Patient Vitals for the past 24 hrs:   Temp Pulse Resp BP SpO2   07/03/21 2234 98.3 °F (36.8 °C) 97 18 123/74 99 %     Records Reviewed: Nursing Notes and Old Medical Records    Provider Notes (Medical Decision Making):   Patient's right tib-fib x-ray today show unchanged mildly displaced extra-articular fractures of the distal tibia and fibula. No new fractures. Results of x-ray findings discussed with the patient. She was offered but declined need for any analgesic medications on today's visit. Reports that she is just relieved to find out about her reassuring x-rays today. Denies being in any significant pain at this time. No ongoing tingling, weakness, numbness. Patient is advised to follow-up with Dr. Michael Fabian as scheduled for ongoing fracture care. She felt comfortable with plan for discharge. Return precautions discussed. Sarkis Bach MD      Disposition:  Discharge      DISCHARGE PLAN:  1. Discharge Medication List as of 7/4/2021 12:57 AM        2. Follow-up Information     Follow up With Specialties Details Why Contact Info    Tonio Edouard MD Orthopedic Surgery In 1 week  [address] [facility]  [address] [address]  [address] [facility]  [phone]          3. Return to ED if worse     Diagnosis     Clinical Impression:   1. Right leg pain    2. Tibia/fibula fracture, right, sequela        Attestations:    Sarkis Bach MD    Please note that this dictation was completed with [employer], the computer voice recognition software. Quite often unanticipated grammatical, syntax, homophones, and other interpretive errors are inadvertently transcribed by the computer software. Please disregard these errors. Please excuse any errors that have escaped final proofreading. Thank you.

## 2021-07-04 NOTE — DISCHARGE INSTRUCTIONS
It was a pleasure taking care of you in our Emergency Department today. We know that when you come to [facility], you are entrusting us with your health, comfort, and safety. Our physicians and nurses honor that trust, and truly appreciate the opportunity to care for you and your loved ones. We also value your feedback. If you receive a survey about your Emergency Department experience today, please fill it out. We care about our patients' feedback, and we listen to what you have to say. Thank you!     Mauricia Habermann, MD

## 2021-09-15 NOTE — PROGRESS NOTES
HISTORY OF PRESENT ILLNESS  Christiano Lala is a 24 y.o. female. HPI     Last here 12/11/20. Pt is here for routine care. This is an established visit completed with telemedicine was completed with video assist  the patient acknowledges and agrees to this method of visitation walter    Pt was in the ED on 7/03/21 for R foot pain  Reviewed xr tib/fib 7/03/21:  Unchanged mildly displaced extra-articular fractures of the distal tibia and  fibula. No new fracture. Pt was in the ED on 6/21/21 for R leg pain  Reviewed xr R ankle 6/21/21:  Displaced oblique fractures of the distal tibia and fibula. Reviewed xr R tib/fib 6/21/21:  Displaced oblique fractures of the distal tibia and fibula. Reviewed xr ankle 6/22/21:  Slightly improved alignment of displaced distal tibia and fibular  fractures following closed reduction. new ortho  Per new ortho ankle fracture is not healing well and is recommending surgery  Additionally, PTH levels concerning per orthoshe had labs done last week PTH was 10    TSH and vit D were normal per pt   total calcium 10.1, alk phos 65 per pt  She states that she has fmhx of thyroid problems  She had been advised by Dr. Omid Wilburn to increase calcium and vitamin D intake  She was taking caltrate vit D and calium 1 pill per day--stopped taking 9/07/21 we will repeat PTH levels and see if they normalize now that she is off these extra supplements    BP today is controlled     Wt was 238 lbs in ED     Reviewed labs     She saw Dr. Omid Wilburn (ortho) for R ankle fracture  Reviewed note 7/01/21:  Treatment Plan: Patient's x-rays reviewed showing progressively maintained alignment of distal tibia and fibula fracture. We discussed treatment options including casting, bracing, and surgical intervention. Patient underwent closed reduction during hospital visit. To avoid disrupting her alignment, I am holding off from removing the splint at this time. We overlayed the splint with fiberglass today.  We will make a decision regarding a possible operation at her next visit. She should avoid any weight-bearing during this interim. Follow-up in 10 days.        She sees Dr. Rissa Evans (GI) for IBS  She takes bentyl 10 mg BID  She no longer takes prilosec prn for gerd she stopped taking the Prilosec primarily because it might cause some thinning of the bones     p      She states her birth control causes migraines as a side effect, doesn't happen often      She is seeing a therapist for anxiety/depression  Sees therapist on routine basis  Doing well without meds     She continues bentyl 10 mg BID, stopped prilosec per ortho     PMHx: ibs     PREVENTIVE:  Colonoscopy: not yet needed  Dexa: not yet needed  Mammo: not yet needed  Pap:Dr. Gabriel Tesfaye  12/11/20  Tdap: 2016  Pneumovax: not yet needed  Shingrix: not yet needed  A1c: 1/21 4.7  Flu shot: 10/20  Eye exam: 3/20  Lipids: 1/21   Covid: both [employer])    Patient Active Problem List    Diagnosis Date Noted    Migraine without aura and without status migrainosus, not intractable 12/11/2020    Gastroesophageal reflux disease without esophagitis 12/11/2020    Anxiety and depression 12/11/2020    Other irritable bowel syndrome 12/11/2020     Current Outpatient Medications   Medication Sig Dispense Refill    norethindrone-ethinyl estradiol (Junel FE 1/20, 28,) 1 mg-20 mcg (21)/75 mg (7) tab Junel FE 1/20 (28) 1 mg-20 mcg (21)/75 mg (7) tablet   Take 1 tablet every day by oral route.  dicyclomine (BENTYL) 10 mg capsule       norethindrone-ethinyl estradiol (Microgestin FE 1/20, 28,) 1 mg-20 mcg (21)/75 mg (7) tab Microgestin FE 1/20 (28) 1 mg-20 mcg (21)/75 mg (7) tablet   Take 1 tablet(s) every day by oral route.       omeprazole (PRILOSEC) 20 mg capsule  (Patient not taking: Reported on 9/17/2021)       Past Surgical History:   Procedure Laterality Date    HX OTHER SURGICAL  4/2/2013    dental surgery(mouth)      Lab Results   Component Value Date/Time    WBC 10.6 06/21/2021 09:59 PM    HGB 15.6 06/21/2021 09:59 PM    HCT 43.7 06/21/2021 09:59 PM    PLATELET 222 93/07/5383 09:59 PM    MCV 88.3 06/21/2021 09:59 PM     Lab Results   Component Value Date/Time    Cholesterol, total 175 (H) 01/04/2021 11:26 AM    HDL Cholesterol 57 01/04/2021 11:26 AM    LDL, calculated 103 01/04/2021 11:26 AM    Triglyceride 82 01/04/2021 11:26 AM     Lab Results   Component Value Date/Time    GFR est non-AA >60 06/21/2021 09:59 PM    GFR est AA >60 06/21/2021 09:59 PM    Creatinine 0.87 06/21/2021 09:59 PM    BUN 11 06/21/2021 09:59 PM    Sodium 139 06/21/2021 09:59 PM    Potassium 3.6 06/21/2021 09:59 PM    Chloride 108 06/21/2021 09:59 PM    CO2 26 06/21/2021 09:59 PM        Review of Systems   Respiratory: Negative for shortness of breath. Cardiovascular: Negative for chest pain. Musculoskeletal: Positive for joint pain. Ankle fracture       Physical Exam  Constitutional:       General: She is not in acute distress. Appearance: Normal appearance. She is not ill-appearing, toxic-appearing or diaphoretic. HENT:      Head: Normocephalic and atraumatic. Eyes:      General:         Right eye: No discharge. Left eye: No discharge. Conjunctiva/sclera: Conjunctivae normal.   Pulmonary:      Effort: No respiratory distress. Neurological:      Mental Status: She is alert and oriented to person, place, and time. Mental status is at baseline. Gait: Gait normal.   Psychiatric:         Mood and Affect: Mood normal.         Behavior: Behavior normal.         ASSESSMENT and PLAN    ICD-10-CM ICD-9-CM    1. Gastroesophageal reflux disease without esophagitis   Doing well no longer requiring Prilosec   K21.9 530.81    2.  Low serum parathyroid hormone (PTH)     Patient was found to have a low serum PTH    Her calcium level was on the higher side of normal which may be the contributing factor    She been taking calcium and vitamin D supplements and this may have driven down her PTH levels    We will repeat labs and perform additional work-up to include SPEP UPEP thyroid function    Depending on results may need to see endocrinology next R79.89 790.6    3. Other irritable bowel syndrome        Doing well on Bentyl as needed     K58.8 564.1    4. Closed fracture of left ankle, initial encounter     Slow healing wound has poor alignment will be getting surgery in the near future surgery is currently on hold until her PTH levels have been evaluated S82.892A 824.8    5. Anxiety and depression  F41.9 300.00    Doing well with therapist F32.9 311         Scribed by Andreina Booth of [address] [place] [address] [address], as dictated by Dr. Karen Caballero. Current diagnosis and concerns discussed with pt at length. Pt understands risks and benefits or current treatment plan and medications, and accepts the treatment and medication with any possible risks. Pt asks appropriate questions, which were answered. Pt was instructed to call with any concerns or problems. I have reviewed the note documented by the scribe. The services provided are my own. The documentation is accurate     [facility], was evaluated through a synchronous (real-time) audio-video encounter. The patient (or guardian if applicable) is aware that this is a billable service. Verbal consent to proceed has been obtained within the past 12 months. The visit was conducted pursuant to the emergency declaration under the [address] [facility], [address] [place] [address] [facility] authority and the [employer] General Act. Patient identification was verified, and a caregiver was present when appropriate. The patient was located in a state where the provider was credentialed to provide care.

## 2021-09-17 ENCOUNTER — VIRTUAL VISIT (OUTPATIENT)
Dept: INTERNAL MEDICINE CLINIC | Age: 21
End: 2021-09-17
Payer: COMMERCIAL

## 2021-09-17 DIAGNOSIS — K58.8 OTHER IRRITABLE BOWEL SYNDROME: ICD-10-CM

## 2021-09-17 DIAGNOSIS — S82.892A CLOSED FRACTURE OF LEFT ANKLE, INITIAL ENCOUNTER: ICD-10-CM

## 2021-09-17 DIAGNOSIS — F32.A ANXIETY AND DEPRESSION: ICD-10-CM

## 2021-09-17 DIAGNOSIS — F41.9 ANXIETY AND DEPRESSION: ICD-10-CM

## 2021-09-17 DIAGNOSIS — K21.9 GASTROESOPHAGEAL REFLUX DISEASE WITHOUT ESOPHAGITIS: Primary | ICD-10-CM

## 2021-09-17 DIAGNOSIS — R79.89 LOW SERUM PARATHYROID HORMONE (PTH): ICD-10-CM

## 2021-09-17 PROCEDURE — 99213 OFFICE O/P EST LOW 20 MIN: CPT | Performed by: INTERNAL MEDICINE

## 2021-09-17 RX ORDER — NORETHINDRONE ACETATE AND ETHINYL ESTRADIOL 1MG-20(21)
KIT ORAL
COMMUNITY